# Patient Record
Sex: MALE | Race: OTHER | NOT HISPANIC OR LATINO | URBAN - METROPOLITAN AREA
[De-identification: names, ages, dates, MRNs, and addresses within clinical notes are randomized per-mention and may not be internally consistent; named-entity substitution may affect disease eponyms.]

---

## 2017-11-29 ENCOUNTER — EMERGENCY (EMERGENCY)
Facility: HOSPITAL | Age: 32
LOS: 1 days | Discharge: HOME | End: 2017-11-29
Attending: EMERGENCY MEDICINE | Admitting: EMERGENCY MEDICINE
Payer: COMMERCIAL

## 2017-11-29 VITALS
OXYGEN SATURATION: 100 % | DIASTOLIC BLOOD PRESSURE: 81 MMHG | SYSTOLIC BLOOD PRESSURE: 146 MMHG | RESPIRATION RATE: 18 BRPM | TEMPERATURE: 100 F | HEART RATE: 79 BPM

## 2017-11-29 PROCEDURE — 99284 EMERGENCY DEPT VISIT MOD MDM: CPT | Mod: 25

## 2017-11-29 PROCEDURE — 73130 X-RAY EXAM OF HAND: CPT | Mod: 26,RT

## 2017-11-29 PROCEDURE — 29125 APPL SHORT ARM SPLINT STATIC: CPT | Mod: RT

## 2017-11-29 PROCEDURE — 73130 X-RAY EXAM OF HAND: CPT

## 2017-11-29 PROCEDURE — 99283 EMERGENCY DEPT VISIT LOW MDM: CPT | Mod: 25

## 2017-11-29 RX ORDER — IBUPROFEN 200 MG
600 TABLET ORAL ONCE
Qty: 0 | Refills: 0 | Status: COMPLETED | OUTPATIENT
Start: 2017-11-29 | End: 2017-11-29

## 2017-11-29 NOTE — ED PROVIDER NOTE - MEDICAL DECISION MAKING DETAILS
r/o fx, declined pain meds, r/o fx, declined pain meds,  Dr. Coates: Male patient with right hand pain following punch to a wall. Exam revealed mild tenderness to right 3rd finger palpation with painful ROM. X-rays revealed 3rd proximal metacarpal fracture with no dislocation or angulation. Right hand and wrist were splinted. Recommendations for rest and follow-up with PMD and hand surgeon were given to patient. Will discharge home.

## 2017-11-29 NOTE — ED PROVIDER NOTE - PLAN OF CARE
1. Return to ED for worsening, progressive or any other concerning symptoms   2. Follow up with your primary care doctor in 2-3days  3. Take motrin 600mg every 6 hours as needed for pain and Take Tylenol up to 650 mg every 6 hours as needed for pain.   4. Rest, apply ice over covered skin for no more than 15 minutes at a time, keep affected extremity elevated, use compressive dressing or splint as provided and instructed.   5. Follow up with the hand surgeon within 1 week, Dr Gabriel

## 2017-11-29 NOTE — ED PROVIDER NOTE - OBJECTIVE STATEMENT
31yo M punched wall tonight, +pain to rt 3rd digit, worse with movement, +swelling. no numbness. no weakness. no pain to elbow/shoulder.

## 2017-11-29 NOTE — ED PROVIDER NOTE - PHYSICAL EXAMINATION
Gen: NAD, AOx3  Head: NCAT  HEENT: oral mucosa moist, normal conjunctiva  Lung: no respiratory distress  CV: Normal perfusion  MSK: +ttp rt 3rd MCP, no wrist/scaphoid ttp  Neuro: No focal neurologic deficits  Skin: No rash   Psych: normal affect

## 2017-11-29 NOTE — ED ADULT NURSE NOTE - OBJECTIVE STATEMENT
32y male presents to the ER c/o right hand pain. pt states he punched a wall today from anger. Pt has positive pulses, able to move all finger. slight pain upon palpation of middle digit. Pt denies any pmh. Pt in NAD. md brunner completed. will reassess.

## 2017-11-29 NOTE — ED PROVIDER NOTE - CARE PLAN
Principal Discharge DX:	Closed fracture of right hand, initial encounter  Instructions for follow-up, activity and diet:	1. Return to ED for worsening, progressive or any other concerning symptoms   2. Follow up with your primary care doctor in 2-3days  3. Take motrin 600mg every 6 hours as needed for pain and Take Tylenol up to 650 mg every 6 hours as needed for pain.   4. Rest, apply ice over covered skin for no more than 15 minutes at a time, keep affected extremity elevated, use compressive dressing or splint as provided and instructed.   5. Follow up with the hand surgeon within 1 week, Dr Gabriel

## 2017-11-29 NOTE — ED PROVIDER NOTE - PROGRESS NOTE DETAILS
Patient was evaluated by me, found in no acute distress, calm, speaking in complete sentences. X-rays revealed 3rd proximal metacarpal fracture with no dislocation or angulation. Right hand and wrist were splinted. Recommendations for rest and follow-up with PMD and hand surgeon were given to patient. Patient is stable for discharge. Will discharge home. I agree with resident assessment and plan. Dr. Thanh Coates
